# Patient Record
Sex: FEMALE | Race: WHITE
[De-identification: names, ages, dates, MRNs, and addresses within clinical notes are randomized per-mention and may not be internally consistent; named-entity substitution may affect disease eponyms.]

---

## 2017-04-26 ENCOUNTER — HOSPITAL ENCOUNTER (EMERGENCY)
Dept: HOSPITAL 47 - EC | Age: 52
Discharge: HOME | End: 2017-04-26
Payer: COMMERCIAL

## 2017-04-26 VITALS
RESPIRATION RATE: 20 BRPM | SYSTOLIC BLOOD PRESSURE: 124 MMHG | HEART RATE: 72 BPM | TEMPERATURE: 97.6 F | DIASTOLIC BLOOD PRESSURE: 87 MMHG
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
## 2017-04-26 DIAGNOSIS — S13.4XXA: Primary | ICD-10-CM

## 2017-04-26 DIAGNOSIS — Z88.5: ICD-10-CM

## 2017-04-26 DIAGNOSIS — Z88.8: ICD-10-CM
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
## 2017-04-26 DIAGNOSIS — Z79.899: ICD-10-CM

## 2017-04-26 DIAGNOSIS — F41.9: ICD-10-CM

## 2017-04-26 PROCEDURE — 72050 X-RAY EXAM NECK SPINE 4/5VWS: CPT

## 2017-04-26 PROCEDURE — 99284 EMERGENCY DEPT VISIT MOD MDM: CPT

## 2017-04-26 NOTE — XR
EXAMINATION TYPE: XR cervical spine trauma

 

DATE OF EXAM: 4/26/2017 9:50 AM

 

CLINICAL HISTORY: pain

 

COMPARISON: NONE

 

TECHNIQUE: Frontal, lateral, oblique, swimmers, and open mouth view of the cervical spine are obtaine
d.

 

FINDINGS:  The cervical spine is visualized in its entirety from C1 thru the top of T1 level. It is s
atisfactory in alignment without evidence of acute fracture or dislocation.  The pre-vertebral soft t
issue appears within normal limits. Degenerative narrowing at C5-6 with mild spondylosis. The C1-C2 a
rticulation is unremarkable on the open mouth view.  The oblique images are within normal limits.

 

IMPRESSION:  No acute fracture or dislocation is seen in the cervical spine.ICD 10 NO FRACTURE, INITI
AL EVALUATION
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
## 2022-10-26 ENCOUNTER — WALK IN (OUTPATIENT)
Dept: URGENT CARE | Age: 57
End: 2022-10-26

## 2022-10-26 VITALS — OXYGEN SATURATION: 97 % | HEART RATE: 78 BPM | WEIGHT: 127 LBS | TEMPERATURE: 98.1 F

## 2022-10-26 DIAGNOSIS — R39.9 GU (GENITOURINARY) SYMPTOMS: ICD-10-CM

## 2022-10-26 DIAGNOSIS — N39.0 ACUTE UTI: Primary | ICD-10-CM

## 2022-10-26 LAB
APPEARANCE, POC: ABNORMAL
BILIRUBIN, POC: NEGATIVE
COLOR, POC: ABNORMAL
GLUCOSE UR-MCNC: NEGATIVE MG/DL
KETONES, POC: NEGATIVE MG/DL
NITRITE, POC: POSITIVE
OCCULT BLOOD, POC: ABNORMAL
PH UR: 6.5 [PH] (ref 5–7)
PROT UR-MCNC: NEGATIVE MG/DL
SP GR UR: 1.02 (ref 1–1.03)
UROBILINOGEN UR-MCNC: 0.2 MG/DL (ref 0–1)
WBC (LEUKOCYTE) ESTERASE, POC: ABNORMAL

## 2022-10-26 PROCEDURE — 87077 CULTURE AEROBIC IDENTIFY: CPT | Performed by: INTERNAL MEDICINE

## 2022-10-26 PROCEDURE — 99204 OFFICE O/P NEW MOD 45 MIN: CPT | Performed by: PHYSICIAN ASSISTANT

## 2022-10-26 PROCEDURE — 87186 SC STD MICRODIL/AGAR DIL: CPT | Performed by: INTERNAL MEDICINE

## 2022-10-26 PROCEDURE — 87086 URINE CULTURE/COLONY COUNT: CPT | Performed by: INTERNAL MEDICINE

## 2022-10-26 PROCEDURE — 81003 URINALYSIS AUTO W/O SCOPE: CPT | Performed by: PHYSICIAN ASSISTANT

## 2022-10-26 RX ORDER — VALACYCLOVIR HYDROCHLORIDE 1 G/1
TABLET, FILM COATED ORAL
COMMUNITY

## 2022-10-26 RX ORDER — GABAPENTIN 300 MG/1
CAPSULE ORAL DAILY
COMMUNITY

## 2022-10-26 RX ORDER — ONDANSETRON 8 MG/1
8 TABLET, ORALLY DISINTEGRATING ORAL EVERY 8 HOURS PRN
COMMUNITY

## 2022-10-26 RX ORDER — DESVENLAFAXINE SUCCINATE 50 MG/1
50 TABLET, EXTENDED RELEASE ORAL DAILY
COMMUNITY
Start: 2022-09-19

## 2022-10-26 RX ORDER — PHENAZOPYRIDINE HYDROCHLORIDE 200 MG/1
200 TABLET, FILM COATED ORAL 3 TIMES DAILY PRN
Qty: 6 TABLET | Refills: 0 | Status: SHIPPED | OUTPATIENT
Start: 2022-10-26

## 2022-10-26 RX ORDER — BUTALBITAL, ACETAMINOPHEN AND CAFFEINE 50; 325; 40 MG/1; MG/1; MG/1
TABLET ORAL
COMMUNITY
Start: 2022-09-20

## 2022-10-26 RX ORDER — FERROUS SULFATE 325(65) MG
325 TABLET ORAL DAILY
COMMUNITY

## 2022-10-26 RX ORDER — BACLOFEN 20 MG/1
20 TABLET ORAL
COMMUNITY

## 2022-10-26 RX ORDER — PYRIDOXINE HCL (VITAMIN B6) 100 MG
TABLET ORAL DAILY
COMMUNITY

## 2022-10-26 RX ORDER — CIPROFLOXACIN 500 MG/1
500 TABLET, FILM COATED ORAL EVERY 12 HOURS
Qty: 10 TABLET | Refills: 0 | Status: SHIPPED | OUTPATIENT
Start: 2022-10-26 | End: 2022-10-31

## 2022-10-26 RX ORDER — FLUCONAZOLE 150 MG/1
150 TABLET ORAL ONCE
Qty: 2 TABLET | Refills: 0 | Status: SHIPPED | OUTPATIENT
Start: 2022-10-26 | End: 2022-10-28

## 2022-10-26 RX ORDER — ALPRAZOLAM 0.25 MG/1
0.25 TABLET ORAL
COMMUNITY

## 2022-10-26 RX ORDER — MULTIVITAMIN WITH IRON
TABLET ORAL DAILY
COMMUNITY

## 2022-10-28 LAB — BACTERIA UR CULT: ABNORMAL
